# Patient Record
Sex: FEMALE | Race: WHITE | NOT HISPANIC OR LATINO | Employment: OTHER | URBAN - METROPOLITAN AREA
[De-identification: names, ages, dates, MRNs, and addresses within clinical notes are randomized per-mention and may not be internally consistent; named-entity substitution may affect disease eponyms.]

---

## 2022-05-11 RX ORDER — LEVOTHYROXINE SODIUM 112 UG/1
112 TABLET ORAL
COMMUNITY
Start: 2021-08-23

## 2022-05-11 RX ORDER — MULTIVITAMIN
1 TABLET ORAL DAILY
COMMUNITY

## 2022-05-11 RX ORDER — FLUTICASONE PROPIONATE 50 MCG
SPRAY, SUSPENSION (ML) NASAL DAILY
COMMUNITY
Start: 2022-01-13

## 2022-05-11 RX ORDER — INSULIN DETEMIR 100 [IU]/ML
INJECTION, SOLUTION SUBCUTANEOUS
COMMUNITY
Start: 2021-08-18

## 2022-05-11 RX ORDER — FUROSEMIDE 40 MG/1
60 TABLET ORAL DAILY
COMMUNITY
Start: 2021-09-13 | End: 2022-08-19 | Stop reason: SDUPTHER

## 2022-05-11 RX ORDER — SOTALOL HYDROCHLORIDE 120 MG/1
120 TABLET ORAL 2 TIMES DAILY
COMMUNITY
Start: 2022-03-28

## 2022-05-11 RX ORDER — LANOLIN ALCOHOL/MO/W.PET/CERES
400 CREAM (GRAM) TOPICAL
COMMUNITY

## 2022-05-11 RX ORDER — LISINOPRIL 2.5 MG/1
2.5 TABLET ORAL DAILY
COMMUNITY
Start: 2022-01-27 | End: 2022-08-18

## 2022-05-11 RX ORDER — EMPAGLIFLOZIN 10 MG/1
10 TABLET, FILM COATED ORAL DAILY
COMMUNITY
Start: 2022-01-27 | End: 2022-11-03 | Stop reason: SDUPTHER

## 2022-05-11 RX ORDER — POTASSIUM CHLORIDE 1.5 G/1.58G
20 POWDER, FOR SOLUTION ORAL 2 TIMES DAILY
COMMUNITY

## 2022-05-13 ENCOUNTER — OFFICE VISIT (OUTPATIENT)
Dept: NEPHROLOGY | Facility: CLINIC | Age: 72
End: 2022-05-13
Payer: MEDICARE

## 2022-05-13 VITALS
BODY MASS INDEX: 44.69 KG/M2 | HEIGHT: 64 IN | DIASTOLIC BLOOD PRESSURE: 82 MMHG | OXYGEN SATURATION: 98 % | TEMPERATURE: 98 F | HEART RATE: 57 BPM | WEIGHT: 261.81 LBS | SYSTOLIC BLOOD PRESSURE: 124 MMHG

## 2022-05-13 DIAGNOSIS — I27.23 PULMONARY HYPERTENSION DUE TO LUNG DISEASE: ICD-10-CM

## 2022-05-13 DIAGNOSIS — J84.10 PULMONARY FIBROSIS: ICD-10-CM

## 2022-05-13 DIAGNOSIS — E11.9 DIABETES MELLITUS, TYPE II, INSULIN DEPENDENT: Primary | ICD-10-CM

## 2022-05-13 DIAGNOSIS — N18.32 ANEMIA IN STAGE 3B CHRONIC KIDNEY DISEASE: ICD-10-CM

## 2022-05-13 DIAGNOSIS — D63.1 ANEMIA IN STAGE 3B CHRONIC KIDNEY DISEASE: ICD-10-CM

## 2022-05-13 DIAGNOSIS — Z79.4 DIABETES MELLITUS, TYPE II, INSULIN DEPENDENT: Primary | ICD-10-CM

## 2022-05-13 DIAGNOSIS — I48.0 PAROXYSMAL ATRIAL FIBRILLATION: ICD-10-CM

## 2022-05-13 DIAGNOSIS — I10 ESSENTIAL HYPERTENSION: ICD-10-CM

## 2022-05-13 DIAGNOSIS — E83.42 HYPOMAGNESEMIA: ICD-10-CM

## 2022-05-13 DIAGNOSIS — N18.32 STAGE 3B CHRONIC KIDNEY DISEASE: ICD-10-CM

## 2022-05-13 PROCEDURE — 99213 OFFICE O/P EST LOW 20 MIN: CPT | Mod: ,,, | Performed by: NURSE PRACTITIONER

## 2022-05-13 PROCEDURE — 99213 PR OFFICE/OUTPT VISIT, EST, LEVL III, 20-29 MIN: ICD-10-PCS | Mod: ,,, | Performed by: NURSE PRACTITIONER

## 2022-05-13 RX ORDER — FERROUS SULFATE 325(65) MG
65 TABLET, DELAYED RELEASE (ENTERIC COATED) ORAL DAILY
COMMUNITY

## 2022-05-13 RX ORDER — MODAFINIL 200 MG/1
200 TABLET ORAL DAILY
COMMUNITY
Start: 2022-01-13

## 2022-05-13 NOTE — PROGRESS NOTES
Pt Name:  Haylee Pelaez  Pt :  1950  Pt MRN:  [id number]    Date: 2022    Reason for visit:   Haylee Pelaez is a 72 y.o. c female presenting for CKD follow up with serum creatinine 1.32, eGFR 40 and Chronic Kidney Disease Stage 3b.     Chief Complaint:   The patient denies any complaints today.    HPI:  The patient is being seen today for follow up CKD and the status of her kidney function. Since the last visit, patient reports she had Covid in  but did not require treatment nor hospitalization and she had a UTI.  The patient denies fatigue, weakness, poor appetite, metallic taste, nausea, cramping, chest pain, palpitations, orthopnea, PND, edema, trouble concentrating, decreased urination.  She c/o  SOB which is chronic. She sustained a fall in her yard earlier this week but denies complaints of injuries.     Home BPs when checked are <130/80 per patient. The patient is following a low sodium diet. The patient is avoiding NSAIDs. The patient is drinking 64+  oz of water daily.     Since last visit on 22 patient reports above noted changes in medical history.      History:   Past Medical History:   Diagnosis Date    Atrial fibrillation     CHF (congestive heart failure)     CKD (chronic kidney disease) stage 3, GFR 30-59 ml/min     Clotting disorder     Diverticulosis     DVT (deep venous thrombosis)     Essential (primary) hypertension     Gastritis     History of cardioversion     x3.   2017, 2019 (restored to NSR) 2019    History of thyroid disease     Internal hemorrhoid     Mixed hyperlipidemia     Pulmonary edema     Pulmonary HTN     TIA (transient ischemic attack)     Type 2 diabetes mellitus without complications      Past Surgical History:   Procedure Laterality Date     BASAL CELL REMOVED      NOSE    CARDIAC ELECTROPHYSIOLOGY STUDY AND ABLATION      CARDIOVERSION      X 5    CATARACT EXTRACTION Bilateral     Rt. , Left     COLONOSCOPY  " 04/20/2010    CORONARY ANGIOPLASTY      ESOPHAGOGASTRODUODENOSCOPY  04/20/2010    EYE SURGERY Bilateral     phaco iol    HYSTERECTOMY  1987    with unilateral oophorectomy    JOINT REPLACEMENT Bilateral     knee     Family History   Problem Relation Age of Onset    Diabetes Mother     Hypertension Mother     Goiter Mother     COPD Father     Heart disease Father     Arthritis Brother     Cancer Maternal Grandmother      Social History     Substance and Sexual Activity   Alcohol Use Never     Social History     Substance and Sexual Activity   Drug Use Never     Social History     Substance and Sexual Activity   Sexual Activity Not on file     has no history on file for sexual activity.  Social History     Tobacco Use   Smoking Status Never Smoker   Smokeless Tobacco Never Used       Allergies:  Review of patient's allergies indicates:   Allergen Reactions    Amlodipine Other (See Comments)     "makes my teeth become loose and gums react"    Sulfa (sulfonamide antibiotics) Itching and Rash    Adhesive Other (See Comments)     [place]         Current Outpatient Medications:     apixaban (ELIQUIS) 5 mg Tab, Take 5 mg by mouth 2 (two) times daily., Disp: , Rfl:     empagliflozin (JARDIANCE) 10 mg tablet, Take 10 mg by mouth once daily., Disp: , Rfl:     ferrous sulfate 325 (65 FE) MG EC tablet, Take 650 mg by mouth once daily., Disp: , Rfl:     fluticasone propionate (FLONASE) 50 mcg/actuation nasal spray, , Disp: , Rfl:     furosemide (LASIX) 40 MG tablet, Take 60 mg by mouth once daily., Disp: , Rfl:     insulin detemir U-100 (LEVEMIR FLEXTOUCH U-100 INSULN) 100 unit/mL (3 mL) InPn pen, , Disp: , Rfl:     levothyroxine (SYNTHROID) 112 MCG tablet, Take 112 mcg by mouth before breakfast., Disp: , Rfl:     lisinopriL (PRINIVIL,ZESTRIL) 2.5 MG tablet, Take 2.5 mg by mouth once daily., Disp: , Rfl:     magnesium oxide (MAG-OX) 400 mg (241.3 mg magnesium) tablet, Take 400 mg by mouth., Disp: , Rfl:    " " modafiniL (PROVIGIL) 200 MG Tab, Take 200 mg by mouth once daily at 6am., Disp: , Rfl:     multivitamin (THERAGRAN) per tablet, Take 1 tablet by mouth once daily., Disp: , Rfl:     potassium chloride (KLOR-CON) 20 mEq Pack, Take 20 mEq by mouth., Disp: , Rfl:     sotaloL (BETAPACE) 120 MG Tab, Take 120 mg by mouth 2 (two) times daily., Disp: , Rfl:     ROS:  Review of Systems   Constitutional: Negative for activity change and appetite change.   HENT: Negative for hearing loss.    Eyes: Negative for visual disturbance.   Respiratory: Positive for shortness of breath. Negative for wheezing.    Cardiovascular: Negative for chest pain.   Gastrointestinal: Negative for abdominal pain, diarrhea, nausea and vomiting.   Genitourinary: Negative for decreased urine volume, dysuria, flank pain, frequency and urgency.   Neurological: Negative for dizziness, weakness and headaches.       Physical Exam:   Vitals:   Vitals:    22 1456   BP: 124/82   Pulse: (!) 57   Temp: 98.4 °F (36.9 °C)   SpO2: 98%   Weight: 118.8 kg (261 lb 12.8 oz)   Height: 5' 3.5" (1.613 m)   PainSc: 0-No pain     Body mass index is 45.65 kg/m².    Physical Exam  Vitals reviewed.   Constitutional:       General: She is not in acute distress.     Appearance: Normal appearance.   HENT:      Head: Normocephalic and atraumatic.      Mouth/Throat:      Mouth: Mucous membranes are moist.   Eyes:      Extraocular Movements: Extraocular movements intact.      Pupils: Pupils are equal, round, and reactive to light.   Cardiovascular:      Rate and Rhythm: Normal rate and regular rhythm.      Heart sounds: No murmur heard.  Pulmonary:      Effort: Pulmonary effort is normal.      Breath sounds: No wheezing, rhonchi or rales.   Musculoskeletal:         General: No swelling.      Right lower le+ Edema present.      Left lower le+ Edema present.   Skin:     General: Skin is warm and dry.   Neurological:      Mental Status: She is alert and oriented to " person, place, and time.   Psychiatric:         Mood and Affect: Mood normal.         Behavior: Behavior normal.         Labs/Tests 5/3/22:   Component Ref Range & Units 10 d ago   (5/3/22) 3 wk ago   (4/19/22) 1 mo ago   (3/22/22) 2 mo ago   (2/21/22) 3 mo ago   (1/24/22) 3 mo ago   (1/24/22) 4 mo ago   (12/20/21)   Sodium 136 - 147 mmol/L 136  136  134 Low   137  137  136  139    Potassium 3.5 - 5.1 mmol/L 3.9  4.0  3.8  3.8  3.8  4.0  3.8    Chloride 98 - 107 mmol/L 103  103  99  104  103  103  101    CO2 20 - 31 mmol/L 27  26  25  24  27  26  29    Glucose 70 - 99 mg/dL 128 High   117 High   160 High   85  167 High   174 High   211 High     BUN 9 - 23 mg/dL 28 High   27 High   31 High   26 High   27 High   20  22    Creatinine 0.55 - 1.02 mg/dL 1.32 High   1.24 High   1.29 High   1.19 High   1.18 High   1.18 High   1.39 High     Calcium 8.3 - 10.6 mg/dL 9.5  9.2  9.5  9.3  9.6  9.4  9.8    Phosphorus Level 2.4 - 5.1 mg/dL 4.0     2.8      Albumin Level 3.2 - 4.8 g/dL 4.0     4.1      eGFR >90 mL/min/1.73m2 40 Low   43 Low   41 Low   45 Low   46 Low   46 Low   38 Low     eGFR  >90 mL/min/1.73m2 46 Low   50 Low  CM  47 Low  CM          Component Ref Range & Units 10 d ago   (5/3/22) 3 mo ago   (1/24/22) 11 mo ago   (6/15/21) 1 yr ago   (12/14/20) 1 yr ago   (6/17/20)   PTH, Intact 15 - 65 pg/mL 77 High   43  51  54      Component Ref Range & Units 10 d ago 3 wk ago 1 mo ago 2 mo ago 3 mo ago 4 mo ago 7 mo ago   Magnesium Level 1.6 - 2.6 mg/dL 2.1  2.2  2.2  2.4        Component Ref Range & Units 10 d ago 1 yr ago   Vitamin D 25-Hydroxy, Blood 30.0 - 100.0 ng/mL 41.4  45.0 CM      Component Ref Range & Units 10 d ago   (5/3/22) 3 mo ago   (1/24/22) 6 mo ago   (10/19/21) 10 mo ago   (6/28/21) 1 yr ago   (1/12/21) 1 yr ago   (1/12/21) 1 yr ago   (7/7/20)   Iron 50 - 175 ug/dL 310 High   339 High   385 High   194 High   372 High   372 High   49 Low     TIBC 250 - 425 ug/dL 402  421  434 High   374    422  388    Iron Saturation 15 - 20 % 77 High   81 High   89 High   52 High         Component Ref Range & Units 10 d ago    Folate >5.4 ng/mL >24.0      Component Ref Range & Units 10 d ago    Vitamin B12 Level 211 - 911 pg/mL 745      Component Ref Range & Units 10 d ago   (5/3/22) 3 mo ago   (1/24/22) 11 mo ago   (6/15/21) 1 yr ago   (5/11/21) 1 yr ago   (12/14/20) 1 yr ago   (6/17/20)   Protein, Urine mg/dL mg/dL <6  <6 R  13 R   11 R     Creatinine, Urine mg/dL mg/dL 10.4  10.3 R  31.2 R  16.1 R  10.2 R  38.0 R    Urine Protein/Creatinine Ratio    CM  0.4 High  R   1.1 High  R     Comment: UNABLE TO CALCULATE DUE TO RESULT OUTSIDE ANALYZER'S          5/11/22:   Component Ref Range & Units 2 d ago   (5/11/22) 6 mo ago   (11/11/21) 1 yr ago   (5/11/21) 1 yr ago   (11/10/20)   Hemoglobin A1C <5.7 % 6.5 High   7.9 High  CM  9.1 High  CM  7.6 High            Diagnosis:  1. Diabetes mellitus, type II, insulin dependent  Renal Function Panel    Renal Function Panel   2. Essential hypertension  Renal Function Panel    Renal Function Panel   3. Paroxysmal atrial fibrillation     4. Hypomagnesemia  Renal Function Panel    Renal Function Panel   5. Stage 3b chronic kidney disease  Protein/Creatinine Ratio, Urine    Renal Function Panel    PTH, Intact    Protein/Creatinine Ratio, Urine    Renal Function Panel    PTH, Intact   6. 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 Pulmonary fibrosis     8. Anemia in stage 3b chronic kidney disease  Hemoglobin    Hemoglobin         Plan:  1. Diabetes mellitus, type II, insulin dependent - controlled - HgbA1c 6.5% 5/11/22; Continue Levemir; continue jardiance 10 mg po every morning;    2. Essential hypertension - at goal - Monitor BP, 2 gram NA diet; Continue Lasix 60 mg po daily, Lisinopril 2.5 mg po daily    3. Paroxysmal atrial fibrillation - Continue Sotalol 120 mg po BID, Continue Eliquis 5 mg po BID   4. Hypomagnesemia - Magnesium 2.1 - Continue Magnesium 400 mg po daily     5. Stage 3b chronic kidney disease - serum creatinine 1.32 / eGFR 40; Avoid NSAIDS, Assure 64 oz of water daily, Meds per # 1, 2, 3    6. 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 Pulmonary fibrosis - managed by Pulmonology    8. Anemia in stage 3b chronic kidney disease - Hgb 10.3 - No indication for EDUIN therapy at this time            My reconciliation of medication should not condone or support use of medications that have been previously prescribed for patients by other providers.  I am only documenting the current medications patients is taking and may change doses, add or discontinue medications based on information provided by the patient.     The patient has been provided with their current level of kidney function including eGFR and creatinine.    We discussed the potential for common complications of CKD including anemia, electrolyte abnormalities, abnormal fluid balance, mineral bone disease and malnutrition.    We discussed strategies to slow the progression of their kidney disease includin. Avoid nephrotoxic agents. Avoid over-the-counter and prescription NSAIDs (Ibuprofren, Motrin, Naproxyn, Aleve, Mobic, Celebrex, Toradol, Advil). All of these can worsen kidney function, elevate BP, cause fluid retention/swelling and elevate potassium. Avoid iodine contrast agents and gadolinium, which can worsen kidney function and/or cause kidney failure. Avoid phosphosoda bowel preps which can worsen kidney function.  2. Work to improve modifiable risk factors. Aim for good control of blood glucose without episodes of hypoglycemia. Notify the provider managing your diabetes if your blood glucose < 60. Aim for good blood pressure control without episodes of hypotension. Call the office if your systolic blood pressure is consistently < 110. Aim for good control of your cholesterol.  ? AIC goal <7.0  ? BP goal <130/80        Keeping these in goal range will help prevent  progression of cardiovascular disease            and chronic kidney disease.    We discussed dietary modifications:  ? Low sodium diet: 2 gm/d or less  ? Limit/avoid high potassium foods  ? Avoid potassium containing salt substitutes  ? Limit/avoid high phosphorus foods  ? Limit daily protein intake to 0.8-1 gm/kg of your ideal body weight.    We discussed lifestyle modifications:  ? Make sure you are drinking plenty of fluids--64 ounces (1/2 gallon) daily  ? Exercise at least 30 minutes 5 x per week (total 150 minutes per week), example brisk walking  ? Achieve and maintain a healthy weight (BMI 20-25)  ? Limit alcohol consumption to <2 drinks per day  ? Stop smoking  ? Make sure you stay current on important vaccines-- pneumonia vaccines (Pneumovax and Prevnar), flu vaccine, Hepatitis B (especially patients nearing renal replacement therapy and planning hemodialysis) and Covid-19 vaccine.     Recommendations:  1. Monitor your BP at home daily and record.  2. Bring readings to your next appt.  3. Call the office if your BP is persistently >130/80.  4. Seek urgent medical attention with signs and symptoms of uremia - extreme weakness, fatigue, confusion, anorexia, metallic taste in mouth, hiccoughs, cramping, itching, chest pain, swelling, or trouble sleeping.    Follow Up:   Follow up in about 3 months (around 8/13/2022).

## 2022-08-18 ENCOUNTER — OFFICE VISIT (OUTPATIENT)
Dept: NEPHROLOGY | Facility: CLINIC | Age: 72
End: 2022-08-18
Payer: MEDICARE

## 2022-08-18 VITALS
HEIGHT: 64 IN | DIASTOLIC BLOOD PRESSURE: 74 MMHG | HEART RATE: 65 BPM | OXYGEN SATURATION: 95 % | BODY MASS INDEX: 43.87 KG/M2 | SYSTOLIC BLOOD PRESSURE: 119 MMHG | WEIGHT: 257 LBS

## 2022-08-18 DIAGNOSIS — N18.31 STAGE 3A CHRONIC KIDNEY DISEASE: ICD-10-CM

## 2022-08-18 DIAGNOSIS — N18.31 ANEMIA IN STAGE 3A CHRONIC KIDNEY DISEASE: ICD-10-CM

## 2022-08-18 DIAGNOSIS — Z79.4 DIABETES MELLITUS, TYPE II, INSULIN DEPENDENT: Primary | ICD-10-CM

## 2022-08-18 DIAGNOSIS — E11.9 DIABETES MELLITUS, TYPE II, INSULIN DEPENDENT: Primary | ICD-10-CM

## 2022-08-18 DIAGNOSIS — J84.10 PULMONARY FIBROSIS: ICD-10-CM

## 2022-08-18 DIAGNOSIS — I48.0 PAROXYSMAL ATRIAL FIBRILLATION: ICD-10-CM

## 2022-08-18 DIAGNOSIS — I27.23 PULMONARY HYPERTENSION DUE TO LUNG DISEASE: ICD-10-CM

## 2022-08-18 DIAGNOSIS — I10 ESSENTIAL HYPERTENSION: ICD-10-CM

## 2022-08-18 DIAGNOSIS — D63.1 ANEMIA IN STAGE 3A CHRONIC KIDNEY DISEASE: ICD-10-CM

## 2022-08-18 PROBLEM — N18.32 STAGE 3B CHRONIC KIDNEY DISEASE: Status: RESOLVED | Noted: 2022-05-13 | Resolved: 2022-08-18

## 2022-08-18 PROCEDURE — 99213 OFFICE O/P EST LOW 20 MIN: CPT | Mod: ,,, | Performed by: NURSE PRACTITIONER

## 2022-08-18 PROCEDURE — 99213 PR OFFICE/OUTPT VISIT, EST, LEVL III, 20-29 MIN: ICD-10-PCS | Mod: ,,, | Performed by: NURSE PRACTITIONER

## 2022-08-18 NOTE — PROGRESS NOTES
"Pt Name:  Haylee Pelaez  Pt :  1950  Pt MRN:  [id number]    Date: 2022    Reason for visit:   Haylee Pelaez is a 72 y.o. c female presenting for CKD follow up with serum creatinine 1.14, eGFR 48 and Chronic Kidney Disease Stage 3a.     Chief Complaint:   The patient denies any complaints today.    HPI:  The patient is being seen today for follow up CKD and the status of her kidney function. Since the last visit, patient reports she had an ECHO on 6/3/22 and a Heart Cath on 22. She stopped her Lisinopril last week due to "itching".  The patient denies fatigue, weakness, poor appetite, metallic taste, nausea, cramping, chest pain, palpitations, orthopnea, PND, edema, trouble concentrating, decreased urination.  She c/o SOB that is chronic.     Home BPs when checked are <130/80 per patient. The patient is following a low sodium diet. The patient is avoiding NSAIDs. The patient is drinking 80 oz of water daily.     Since last visit on 22 patient reports above note changes in medical history.      History:   Past Medical History:   Diagnosis Date    Atrial fibrillation     CHF (congestive heart failure)     CKD (chronic kidney disease) stage 3, GFR 30-59 ml/min     Clotting disorder     Diverticulosis     DVT (deep venous thrombosis)     Essential (primary) hypertension     Gastritis     History of cardioversion     x3.   2017, 2019 (restored to NSR) 2019    History of thyroid disease     Internal hemorrhoid     Mixed hyperlipidemia     Pulmonary edema     Pulmonary HTN     TIA (transient ischemic attack)     Type 2 diabetes mellitus without complications      Past Surgical History:   Procedure Laterality Date     BASAL CELL REMOVED      NOSE    CARDIAC ELECTROPHYSIOLOGY STUDY AND ABLATION      CARDIOVERSION      X 5    CATARACT EXTRACTION Bilateral     Rt. , Left 2013    COLONOSCOPY  2010    CORONARY ANGIOPLASTY      ESOPHAGOGASTRODUODENOSCOPY  " "04/20/2010    EYE SURGERY Bilateral     phaco iol    HYSTERECTOMY  1987    with unilateral oophorectomy    JOINT REPLACEMENT Bilateral     knee     Family History   Problem Relation Age of Onset    Diabetes Mother     Hypertension Mother     Goiter Mother     COPD Father     Heart disease Father     Arthritis Brother     Cancer Maternal Grandmother      Social History     Substance and Sexual Activity   Alcohol Use Never     Social History     Substance and Sexual Activity   Drug Use Never     Social History     Substance and Sexual Activity   Sexual Activity Not on file     has no history on file for sexual activity.  Social History     Tobacco Use   Smoking Status Never Smoker   Smokeless Tobacco Never Used       Allergies:  Review of patient's allergies indicates:   Allergen Reactions    Amlodipine Other (See Comments)     "makes my teeth become loose and gums react"    Sulfa (sulfonamide antibiotics) Itching and Rash    Lisinopril Itching    Adhesive Other (See Comments)     [place]         Current Outpatient Medications:     apixaban (ELIQUIS) 5 mg Tab, Take 5 mg by mouth 2 (two) times daily., Disp: , Rfl:     empagliflozin (JARDIANCE) 10 mg tablet, Take 10 mg by mouth once daily., Disp: , Rfl:     ferrous sulfate 325 (65 FE) MG EC tablet, Take 65 mg by mouth once daily., Disp: , Rfl:     fluticasone propionate (FLONASE) 50 mcg/actuation nasal spray, once daily at 6am., Disp: , Rfl:     furosemide (LASIX) 40 MG tablet, Take 60 mg by mouth once daily., Disp: , Rfl:     insulin detemir U-100 (LEVEMIR FLEXTOUCH U-100 INSULN) 100 unit/mL (3 mL) InPn pen, , Disp: , Rfl:     levothyroxine (SYNTHROID) 112 MCG tablet, Take 112 mcg by mouth before breakfast., Disp: , Rfl:     magnesium oxide (MAG-OX) 400 mg (241.3 mg magnesium) tablet, Take 400 mg by mouth., Disp: , Rfl:     modafiniL (PROVIGIL) 200 MG Tab, Take 200 mg by mouth once daily at 6am., Disp: , Rfl:     multivitamin (THERAGRAN) per " "tablet, Take 1 tablet by mouth once daily., Disp: , Rfl:     potassium chloride (KLOR-CON) 20 mEq Pack, Take 20 mEq by mouth 2 (two) times daily., Disp: , Rfl:     sotaloL (BETAPACE) 120 MG Tab, Take 120 mg by mouth 2 (two) times daily., Disp: , Rfl:     ROS:  Review of Systems   Constitutional: Negative for activity change and appetite change.   HENT: Negative for hearing loss.    Eyes: Negative for visual disturbance.   Respiratory: Positive for shortness of breath. Negative for wheezing.    Cardiovascular: Negative for chest pain.   Gastrointestinal: Negative for abdominal pain, diarrhea, nausea and vomiting.   Genitourinary: Negative for decreased urine volume, dysuria, flank pain, frequency and urgency.   Neurological: Negative for dizziness, weakness and headaches.       Physical Exam:   Vitals:   Vitals:    08/18/22 1254   BP: 119/74   Pulse: 65   SpO2: 95%   Weight: 116.6 kg (257 lb)   Height: 5' 3.5" (1.613 m)     Body mass index is 44.81 kg/m².    Physical Exam  Vitals reviewed.   Constitutional:       General: She is not in acute distress.     Appearance: Normal appearance.   HENT:      Head: Normocephalic and atraumatic.      Mouth/Throat:      Mouth: Mucous membranes are moist.   Eyes:      Extraocular Movements: Extraocular movements intact.      Pupils: Pupils are equal, round, and reactive to light.   Cardiovascular:      Rate and Rhythm: Normal rate and regular rhythm.      Heart sounds: No murmur heard.  Pulmonary:      Effort: Pulmonary effort is normal.      Breath sounds: No wheezing, rhonchi or rales.   Musculoskeletal:         General: No swelling.   Skin:     General: Skin is warm and dry.   Neurological:      Mental Status: She is alert and oriented to person, place, and time.   Psychiatric:         Mood and Affect: Mood normal.         Behavior: Behavior normal.           Labs/Tests:  Lab Results   Component Value Date     08/17/2022    K 4.0 08/17/2022     08/17/2022    CO2 " 29 08/17/2022    BUN 25 (H) 08/17/2022    CREATININE 1.14 (H) 08/17/2022    CREATININE 1.19 (H) 07/20/2022    CREATININE 1.20 (H) 06/20/2022    GLUCOSE 102 (H) 08/17/2022    CALCIUM 9.7 08/17/2022    PHOSPHORUS 3.9 08/17/2022    ALBUMIN 4.2 08/17/2022    EGFRNONAA 48 (L) 08/17/2022    EGFRNONAA 45 (L) 07/20/2022    EGFRNONAA 45 (L) 06/20/2022    ESTGFRAFRICA 55 (L) 08/17/2022    ESTGFRAFRICA 52 (L) 07/20/2022    ESTGFRAFRICA 52 (L) 06/20/2022    PTH 37 08/17/2022    HGB 9.4 (L) 07/20/2022    HGBA1C 5.9 (H) 08/17/2022    IRON 310 (H) 05/03/2022    TIBC 402 05/03/2022    FERRITIN 26.6 01/24/2022    TRIG 118 05/11/2022    CHOL 155 05/11/2022    HDL 36 (L) 05/11/2022    LDLCALC 95 05/11/2022    LABVLDL 24 05/11/2022    DIBSAPTU45XM 41.4 05/03/2022     Component Ref Range & Units 1 d ago   (8/17/22) 3 mo ago   (5/11/22) 3 mo ago   (5/3/22) 6 mo ago   (1/24/22) 1 yr ago   (6/15/21)   Protein, Urine mg/dL mg/dL <6   <6  <6 R  13 R    Creatinine, Urine mg/dL mg/dL 14.6  99.3  10.4  10.3 R  31.2 R    Urine Protein/Creatinine Ratio     CM   CM  0.4 High  R    Comment: UNABLE TO CALCULATE DUE TO RESULT OUTSIDE ANALYZER'S REPORTABLE     ECHO 6/3/22:  EF 55 - 60%    Heart Cath 6/22/22:   Conclusion:     Moderate pulmonary hypertension with mean PA pressure of 30 and PA systolic pressure of 65 to 70 mmHg  Elevated LVEDP and pulmonary capillary wedge pressure to around 20-24     Continue with more aggressive diuresis and reassessment with echo  Consider referral for pulmonary hypertension clinic.    Diagnosis:  1. Diabetes mellitus, type II, insulin dependent  Renal Function Panel    Renal Function Panel   2. Essential hypertension  Renal Function Panel    Renal Function Panel   3. Stage 3a chronic kidney disease  Renal Function Panel    PTH, Intact    Protein/Creatinine Ratio, Urine    Renal Function Panel    PTH, Intact    Protein/Creatinine Ratio, Urine   4. Anemia in stage 3a chronic kidney disease  Ferritin    Iron and TIBC     Hemoglobin    Ferritin    Iron and TIBC    Hemoglobin    Hemoglobin    Hemoglobin   5. Paroxysmal atrial fibrillation     6. 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 Pulmonary fibrosis         Plan:  Diabetes mellitus, type II, insulin dependent  Well controlled with HgbA1c 5.9% on 22 - Continue Jardiance 10 mg po daily and Levemir     Essential hypertension  At goal - Monitor BP, 2 gram NA diet; Continue Lasix 60 mg po daily; (had to stop Lisinopril due to pruritis)     Stage 3a chronic kidney disease  Serum creatinine 1.14 /  eGFR 48; Avoid NSAIDS, Assure 64 oz of water daily - Meds per med list above     Anemia in stage 3a chronic kidney disease  Hgb 9.4  - will re-order hgb as it was ordered and not done - check iron studies as well.     Paroxysmal atrial fibrillation  Managed by Cardiology     Pulmonary hypertension due to lung disease  Managed by Pulmonology     Pulmonary fibrosis  Managed by Pulmonology         My reconciliation of medication should not condone or support use of medications that have been previously prescribed for patients by other providers.  I am only documenting the current medications patients is taking and may change doses, add or discontinue medications based on information provided by the patient.     The patient has been provided with their current level of kidney function including eGFR and creatinine.    We discussed the potential for common complications of CKD including anemia, electrolyte abnormalities, abnormal fluid balance, mineral bone disease and malnutrition.    We discussed strategies to slow the progression of their kidney disease includin. Avoid nephrotoxic agents. Avoid over-the-counter and prescription NSAIDs (Ibuprofren, Motrin, Naproxyn, Aleve, Mobic, Celebrex, Toradol, Advil). All of these can worsen kidney function, elevate BP, cause fluid retention/swelling and elevate potassium. Avoid iodine contrast agents and gadolinium, which can  worsen kidney function and/or cause kidney failure. Avoid phosphosoda bowel preps which can worsen kidney function.  2. Work to improve modifiable risk factors. Aim for good control of blood glucose without episodes of hypoglycemia. Notify the provider managing your diabetes if your blood glucose < 60. Aim for good blood pressure control without episodes of hypotension. Call the office if your systolic blood pressure is consistently < 110. Aim for good control of your cholesterol.  ? AIC goal <7.0  ? BP goal <130/80        Keeping these in goal range will help prevent progression of cardiovascular disease            and chronic kidney disease.    We discussed dietary modifications:  ? Low sodium diet: 2 gm/d or less  ? Limit/avoid high potassium foods  ? Avoid potassium containing salt substitutes  ? Limit/avoid high phosphorus foods  ? Limit daily protein intake to 0.8-1 gm/kg of your ideal body weight.    We discussed lifestyle modifications:  ? Make sure you are drinking plenty of fluids--64 ounces (1/2 gallon) daily  ? Exercise at least 30 minutes 5 x per week (total 150 minutes per week), example brisk walking  ? Achieve and maintain a healthy weight (BMI 20-25)  ? Limit alcohol consumption to <2 drinks per day  ? Stop smoking  ? Make sure you stay current on important vaccines-- pneumonia vaccines (Pneumovax and Prevnar), flu vaccine, Hepatitis B (especially patients nearing renal replacement therapy and planning hemodialysis) and Covid-19 vaccine.     Recommendations:  1. Monitor your BP at home daily and record.  2. Bring readings to your next appt.  3. Call the office if your BP is persistently >130/80.  4. Seek urgent medical attention with signs and symptoms of uremia - extreme weakness, fatigue, confusion, anorexia, metallic taste in mouth, hiccoughs, cramping, itching, chest pain, swelling, or trouble sleeping.    Follow Up:   Follow up in about 6 months (around 2/18/2023).

## 2022-08-18 NOTE — ASSESSMENT & PLAN NOTE
Serum creatinine 1.14 /  eGFR 48; Avoid NSAIDS, Assure 64 oz of water daily - Meds per med list above

## 2022-08-18 NOTE — ASSESSMENT & PLAN NOTE
At goal - Monitor BP, 2 gram NA diet; Continue Lasix 60 mg po daily; (had to stop Lisinopril due to pruritis)

## 2022-08-19 ENCOUNTER — TELEPHONE (OUTPATIENT)
Dept: NEPHROLOGY | Facility: CLINIC | Age: 72
End: 2022-08-19
Payer: MEDICARE

## 2022-08-19 RX ORDER — FUROSEMIDE 40 MG/1
60 TABLET ORAL DAILY
Qty: 135 TABLET | Refills: 3 | Status: SHIPPED | OUTPATIENT
Start: 2022-08-19

## 2022-08-19 NOTE — TELEPHONE ENCOUNTER
Refilled as requested     ----- Message from Nelly Pendleton LPN sent at 8/19/2022  2:29 PM CDT -----  Contact: pt    ----- Message -----  From: Manuela Castro  Sent: 8/19/2022  10:37 AM CDT  To: Ryan Ray Staff    Type:  RX Refill Request    Who Called:  pt  Refill or New Rx:  refill  RX Name and Strength:   furosemide (LASIX) 40 MG tablet  How is the patient currently taking it? (ex. 1XDay):  as ordered  Is this a 30 day or 90 day RX:  90  Preferred Pharmacy with phone number:    [facility] - [address] [address]  Phone: [phone] Fax: [phone]      Local or Mail Order:  local and mail  Ordering Provider:   ryan Vergara Call Back Number:  [phone]  Additional Information:       Pt is asking for a 30 day supply to be called in to pharm below due to almost out and needs before mail order comes in.       [facility] - [address] [address]  Phone: [phone] Fax: [phone]